# Patient Record
Sex: FEMALE | Race: OTHER | HISPANIC OR LATINO | URBAN - METROPOLITAN AREA
[De-identification: names, ages, dates, MRNs, and addresses within clinical notes are randomized per-mention and may not be internally consistent; named-entity substitution may affect disease eponyms.]

---

## 2020-06-25 ENCOUNTER — EMERGENCY (EMERGENCY)
Facility: HOSPITAL | Age: 45
LOS: 1 days | Discharge: HOME | End: 2020-06-25
Attending: EMERGENCY MEDICINE | Admitting: EMERGENCY MEDICINE
Payer: MEDICAID

## 2020-06-25 VITALS
SYSTOLIC BLOOD PRESSURE: 138 MMHG | RESPIRATION RATE: 16 BRPM | DIASTOLIC BLOOD PRESSURE: 80 MMHG | OXYGEN SATURATION: 99 % | TEMPERATURE: 99 F | HEART RATE: 80 BPM

## 2020-06-25 DIAGNOSIS — Z98.82 BREAST IMPLANT STATUS: Chronic | ICD-10-CM

## 2020-06-25 DIAGNOSIS — Z98.89 OTHER SPECIFIED POSTPROCEDURAL STATES: Chronic | ICD-10-CM

## 2020-06-25 PROCEDURE — 99283 EMERGENCY DEPT VISIT LOW MDM: CPT

## 2020-06-25 RX ORDER — IBUPROFEN 200 MG
600 TABLET ORAL ONCE
Refills: 0 | Status: COMPLETED | OUTPATIENT
Start: 2020-06-25 | End: 2020-06-25

## 2020-06-25 RX ORDER — IBUPROFEN 200 MG
1 TABLET ORAL
Qty: 20 | Refills: 0
Start: 2020-06-25

## 2020-06-25 RX ORDER — CYCLOBENZAPRINE HYDROCHLORIDE 10 MG/1
1 TABLET, FILM COATED ORAL
Qty: 12 | Refills: 0
Start: 2020-06-25

## 2020-06-25 RX ORDER — CYCLOBENZAPRINE HYDROCHLORIDE 10 MG/1
5 TABLET, FILM COATED ORAL ONCE
Refills: 0 | Status: COMPLETED | OUTPATIENT
Start: 2020-06-25 | End: 2020-06-25

## 2020-06-25 RX ORDER — LIDOCAINE 4 G/100G
1 CREAM TOPICAL ONCE
Refills: 0 | Status: COMPLETED | OUTPATIENT
Start: 2020-06-25 | End: 2020-06-25

## 2020-06-25 RX ADMIN — CYCLOBENZAPRINE HYDROCHLORIDE 5 MILLIGRAM(S): 10 TABLET, FILM COATED ORAL at 13:37

## 2020-06-25 RX ADMIN — Medication 600 MILLIGRAM(S): at 13:38

## 2020-06-25 RX ADMIN — LIDOCAINE 1 PATCH: 4 CREAM TOPICAL at 13:38
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
## 2020-06-25 NOTE — ED PROVIDER NOTE - NSFOLLOWUPINSTRUCTIONS_ED_ALL_ED_FT
Rest and avoid heavy lifting.   May take Motrin 600mg every 6 hours as needed for moderate pain. Take with food.   May take Flexeril 5mg every 6 hours as needed for severe pain.
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
## 2020-06-25 NOTE — ED PROVIDER NOTE - PATIENT PORTAL LINK FT
You can access the FollowMyHealth Patient Portal offered by [facility] by registering at the following website: [email or web address]. By joining [employer]’s FollowMyHealth portal, you will also be able to view your health information using other applications (apps) compatible with our system.

## 2020-11-27 ENCOUNTER — EMERGENCY (EMERGENCY)
Facility: HOSPITAL | Age: 45
LOS: 1 days | Discharge: HOME | End: 2020-11-27
Attending: EMERGENCY MEDICINE | Admitting: EMERGENCY MEDICINE
Payer: MEDICAID

## 2020-11-27 VITALS
HEIGHT: 62 IN | OXYGEN SATURATION: 99 % | SYSTOLIC BLOOD PRESSURE: 132 MMHG | HEART RATE: 85 BPM | RESPIRATION RATE: 18 BRPM | TEMPERATURE: 98 F | DIASTOLIC BLOOD PRESSURE: 83 MMHG

## 2020-11-27 DIAGNOSIS — Z98.89 OTHER SPECIFIED POSTPROCEDURAL STATES: Chronic | ICD-10-CM

## 2020-11-27 DIAGNOSIS — Z90.710 ACQUIRED ABSENCE OF BOTH CERVIX AND UTERUS: Chronic | ICD-10-CM

## 2020-11-27 DIAGNOSIS — Z98.82 BREAST IMPLANT STATUS: Chronic | ICD-10-CM

## 2020-11-27 LAB
APPEARANCE UR: SIGNIFICANT CHANGE UP
BACTERIA # UR AUTO: NEGATIVE — SIGNIFICANT CHANGE UP
BILIRUB UR-MCNC: NEGATIVE — SIGNIFICANT CHANGE UP
BLOOD UR QL VISUAL: HIGH
COLOR SPEC: SIGNIFICANT CHANGE UP
GLUCOSE UR-MCNC: NEGATIVE — SIGNIFICANT CHANGE UP
HYALINE CASTS # UR AUTO: SIGNIFICANT CHANGE UP
KETONES UR-MCNC: NEGATIVE — SIGNIFICANT CHANGE UP
LEUKOCYTE ESTERASE UR-ACNC: SIGNIFICANT CHANGE UP
NITRITE UR-MCNC: NEGATIVE — SIGNIFICANT CHANGE UP
PH UR: 7 — SIGNIFICANT CHANGE UP (ref 5–8)
PROT UR-MCNC: 20 — SIGNIFICANT CHANGE UP
RBC CASTS # UR COMP ASSIST: >50 — HIGH (ref 0–?)
SP GR SPEC: 1.01 — SIGNIFICANT CHANGE UP (ref 1–1.04)
SQUAMOUS # UR AUTO: SIGNIFICANT CHANGE UP
UROBILINOGEN FLD QL: NORMAL — SIGNIFICANT CHANGE UP
WBC UR QL: >50 — HIGH (ref 0–?)

## 2020-11-27 PROCEDURE — 99283 EMERGENCY DEPT VISIT LOW MDM: CPT

## 2020-11-27 RX ORDER — CEPHALEXIN 500 MG
1 CAPSULE ORAL
Qty: 28 | Refills: 0
Start: 2020-11-27 | End: 2020-12-03

## 2020-11-27 NOTE — ED PROVIDER NOTE - NSFOLLOWUPINSTRUCTIONS_ED_ALL_ED_FT
You came to the ER with burning urination and blood in your urine. We checked your urine and believe it is from a urinary tract infection. We have sent a medication CEPHALEXIN/KEFLEX 500 MG to your pharmacy, take one pill every six hours for a week. Follow up with your primary care doctor after.    Please return if you develop a fever, have worse back pain, stop producing urine, feel more ill, or are at all concerned and would like re evaluation. It is possible though unlikely that this infection could spread upwards to your kidneys and need further treatment.     -If you do not have a PMD, please call [phone] to find one convenient for you or call our clinic at [phone].

## 2020-11-27 NOTE — ED PROVIDER NOTE - PROGRESS NOTE DETAILS
Jose: spoke with patient she feels better, has minimal pain at this time. informed her of UTI, given return precautions, sent rx to pharmacy. Pt verbalizes understanding instructions.

## 2020-11-27 NOTE — ED PROVIDER NOTE - NS ED ROS FT
Constitutional: (-) fever (-) vomiting  Eyes/ENT: (-) vision changes  Cardiovascular: (-) chest pain, (-) wheezing  Respiratory: (-) cough, (-) shortness of breath  Gastrointestinal: (-) vomiting, (-) diarrhea  : (+) dysuria   Musculoskeletal: (-) back pain  Integumentary: (-) rash, (-) edema  Neurological: (-)loc  Allergic/Immunologic: (-) pruritus  Endocrine: No history of thyroid disease

## 2020-11-27 NOTE — ED PROVIDER NOTE - PHYSICAL EXAMINATION
Vitals: I have reviewed the patients vital signs. Afebrile non tachy  General: well appearing, well nourished, no acute distress  HEENT: atraumatic, normocephalic, airway patent, EOMI and appropriate tracking  Neck: no JVD, no tracheal deviation  Chest/Lungs: no trauma, symmetric chest rise, lung sounds clear bilaterally, speaking in complete sentences  Heart: Regular rate, regular rhythm, skin well perfused, 2+ pulses  Abdomen: Soft and nontender except for suprapubic area which has some mild tenderness and discomfort. No rebound or guarding  back: no cvat no midline tenderness  Neuro: A+Ox3, ambulating without difficulty, non dysarthric speech, moving all four extremities without difficulty  Eyes: EOMI, no conjunctival injection  MSK: all limbs at baseline strength, no wasting or atrophy,   Skin: no bleeding, no cyanosis, no jaundice, no new emergent lesions     Pelvic: Vitals: I have reviewed the patients vital signs. Afebrile non tachy  General: well appearing, well nourished, no acute distress  HEENT: atraumatic, normocephalic, airway patent, EOMI and appropriate tracking  Neck: no JVD, no tracheal deviation  Chest/Lungs: no trauma, symmetric chest rise, lung sounds clear bilaterally, speaking in complete sentences  Heart: Regular rate, regular rhythm, skin well perfused, 2+ pulses  Abdomen: Soft and nontender except for suprapubic area which has some mild tenderness and discomfort. No rebound or guarding  back: no cvat no midline tenderness  Neuro: A+Ox3, ambulating without difficulty, non dysarthric speech, moving all four extremities without difficulty  Eyes: EOMI, no conjunctival injection  MSK: all limbs at baseline strength, no wasting or atrophy,   Skin: no bleeding, no cyanosis, no jaundice, no new emergent lesions     Pelvic: chaperoned by PCA, no external or internal lesions, scant physiologic discharge, no bleeding, no large masses, no CMT, no adnexal tenderness.

## 2020-11-27 NOTE — ED PROVIDER NOTE - ATTENDING CONTRIBUTION TO CARE
I have personally performed a face to face bedside history and physical examination of this patient. I have discussed the history, examination, review of systems, assessment and plan of management with the resident. I have reviewed the electronic medical record and amended it to reflect my history, review of systems, physical exam, assessment and plan.     Brief HPI:  46 y/o F no sig PMH s/p DARRYL without oophorectomy presents with 1 day of worsening hematuria, dysuria (burning), suprapubic pain, and hematuria.  Reports chills, no measured temperatures at home.  Also with intermittent b/l flank pain.      Vitals:   Reviewed    Exam:    GEN:  Non-toxic appearing, non-distressed, speaking full sentences, non-diaphoretic, AAOx3  HEENT:  NCAT, neck supple, EOMI, PERRLA, sclera anicteric, no conjunctival pallor or injection, no stridor, normal voice, no tonsillar exudate, uvula midline, tympanic membranes and external auditory canals normal appearing bilaterally   CV:  regular rhythm and rate, s1/s2 audible, no murmurs, rubs or gallops, peripheral pulses 2+ and symmetric  PULM:  non-labored respirations, lungs clear to auscultation bilaterally, no wheezes, crackles or rales  ABD:  non distended, suprapubic tenderness, no rebound, no guarding, negative Dey's sign, bowel sounds normal, no cvat  MSK:  no gross deformity, non-tender extremities and joints, range of motion grossly normal appearing, no extremity edema, extremities warm and well perfused   NEURO:  AAOx3, CN II-XII intact, motor 5/5 in upper and lower extremities bilaterally, sensation grossly intact in extremities and trunk, finger to nose testing wnl, no nystagmus, negative Romberg, no pronator drift, no gait deficit  SKIN:  warm, dry, no rash or vesicles     A/P:  46 y/o F no sig PMH s/p DARRYL without oophorectomy presents with 1 day of worsening hematuria, dysuria (burning), suprapubic pain, and hematuria.  VSS AF.  Exam with mild suprapubic tenderness, no cvat.  Suspect cystitis.  Will send ua.  Dispo pending.

## 2020-11-27 NOTE — ED PROVIDER NOTE - PATIENT PORTAL LINK FT
You can access the FollowMyHealth Patient Portal offered by [facility] by registering at the following website: [email or web address]. By joining [employer]’s FollowMyHealth portal, you will also be able to view your health information using other applications (apps) compatible with our system.

## 2020-11-27 NOTE — ED PROVIDER NOTE - CLINICAL SUMMARY MEDICAL DECISION MAKING FREE TEXT BOX
46 y/o F no sig PMH s/p DARRYL without oophorectomy presents with 1 day of worsening hematuria, dysuria (burning), suprapubic pain, and hematuria- now passing clots and beena blood. Afebrile, feels warm, recent travel without sick contacts. Suprapubic pain, no CVAT. Will do pelvic exam. Considering hemorrhagic cystitis, less likely STI/mass/PID though has dyspareunia so will eval for STI. Unlikely pyelo or stone, no CVAT, pain is minimal. Likely DC.

## 2020-11-27 NOTE — ED PROVIDER NOTE - OBJECTIVE STATEMENT
44 y/o F no sig PMH s/p DARRYL without oophorectomy presents with 1 day of worsening hematuria, dysuria (burning), suprapubic pain, and hematuria- now passing clots and beena blood. States has never had anything like this before, no previous UTIs. Doesn't have fever documented though feels "warm" inside. Recent travel to [place], no specific known sick contacts. Sexually active, one partner, no barrier protection. No vaginal discharge though does have dyspareunia. Denies upper abd pain, cp, sob.

## 2020-11-28 LAB
C TRACH RRNA SPEC QL NAA+PROBE: SIGNIFICANT CHANGE UP
N GONORRHOEA RRNA SPEC QL NAA+PROBE: SIGNIFICANT CHANGE UP
SPECIMEN SOURCE: SIGNIFICANT CHANGE UP

## 2022-07-13 ENCOUNTER — EMERGENCY (EMERGENCY)
Facility: HOSPITAL | Age: 47
LOS: 1 days | Discharge: HOME | End: 2022-07-13
Attending: STUDENT IN AN ORGANIZED HEALTH CARE EDUCATION/TRAINING PROGRAM | Admitting: STUDENT IN AN ORGANIZED HEALTH CARE EDUCATION/TRAINING PROGRAM

## 2022-07-13 VITALS
OXYGEN SATURATION: 100 % | DIASTOLIC BLOOD PRESSURE: 77 MMHG | TEMPERATURE: 98 F | RESPIRATION RATE: 16 BRPM | HEART RATE: 72 BPM | SYSTOLIC BLOOD PRESSURE: 135 MMHG | HEIGHT: 62 IN

## 2022-07-13 VITALS
HEART RATE: 74 BPM | RESPIRATION RATE: 16 BRPM | DIASTOLIC BLOOD PRESSURE: 76 MMHG | OXYGEN SATURATION: 100 % | SYSTOLIC BLOOD PRESSURE: 130 MMHG | TEMPERATURE: 99 F

## 2022-07-13 DIAGNOSIS — Z98.89 OTHER SPECIFIED POSTPROCEDURAL STATES: Chronic | ICD-10-CM

## 2022-07-13 DIAGNOSIS — Z98.82 BREAST IMPLANT STATUS: Chronic | ICD-10-CM

## 2022-07-13 DIAGNOSIS — Z90.710 ACQUIRED ABSENCE OF BOTH CERVIX AND UTERUS: Chronic | ICD-10-CM

## 2022-07-13 LAB
ALBUMIN SERPL ELPH-MCNC: 4.1 G/DL — SIGNIFICANT CHANGE UP (ref 3.3–5)
ALP SERPL-CCNC: 51 U/L — SIGNIFICANT CHANGE UP (ref 40–120)
ALT FLD-CCNC: 35 U/L — HIGH (ref 4–33)
ANION GAP SERPL CALC-SCNC: 10 MMOL/L — SIGNIFICANT CHANGE UP (ref 7–14)
AST SERPL-CCNC: 18 U/L — SIGNIFICANT CHANGE UP (ref 4–32)
BASOPHILS # BLD AUTO: 0.05 K/UL — SIGNIFICANT CHANGE UP (ref 0–0.2)
BASOPHILS NFR BLD AUTO: 0.7 % — SIGNIFICANT CHANGE UP (ref 0–2)
BILIRUB SERPL-MCNC: <0.2 MG/DL — SIGNIFICANT CHANGE UP (ref 0.2–1.2)
BUN SERPL-MCNC: 13 MG/DL — SIGNIFICANT CHANGE UP (ref 7–23)
CALCIUM SERPL-MCNC: 10 MG/DL — SIGNIFICANT CHANGE UP (ref 8.4–10.5)
CHLORIDE SERPL-SCNC: 101 MMOL/L — SIGNIFICANT CHANGE UP (ref 98–107)
CO2 SERPL-SCNC: 27 MMOL/L — SIGNIFICANT CHANGE UP (ref 22–31)
CREAT SERPL-MCNC: 0.68 MG/DL — SIGNIFICANT CHANGE UP (ref 0.5–1.3)
EGFR: 108 ML/MIN/1.73M2 — SIGNIFICANT CHANGE UP
EOSINOPHIL # BLD AUTO: 0.14 K/UL — SIGNIFICANT CHANGE UP (ref 0–0.5)
EOSINOPHIL NFR BLD AUTO: 1.9 % — SIGNIFICANT CHANGE UP (ref 0–6)
FLUAV AG NPH QL: SIGNIFICANT CHANGE UP
FLUBV AG NPH QL: SIGNIFICANT CHANGE UP
GLUCOSE SERPL-MCNC: 115 MG/DL — HIGH (ref 70–99)
HCT VFR BLD CALC: 39.4 % — SIGNIFICANT CHANGE UP (ref 34.5–45)
HGB BLD-MCNC: 13.1 G/DL — SIGNIFICANT CHANGE UP (ref 11.5–15.5)
IANC: 4.44 K/UL — SIGNIFICANT CHANGE UP (ref 1.8–7.4)
IMM GRANULOCYTES NFR BLD AUTO: 0.4 % — SIGNIFICANT CHANGE UP (ref 0–1.5)
LYMPHOCYTES # BLD AUTO: 2.15 K/UL — SIGNIFICANT CHANGE UP (ref 1–3.3)
LYMPHOCYTES # BLD AUTO: 28.9 % — SIGNIFICANT CHANGE UP (ref 13–44)
MCHC RBC-ENTMCNC: 30.7 PG — SIGNIFICANT CHANGE UP (ref 27–34)
MCHC RBC-ENTMCNC: 33.2 GM/DL — SIGNIFICANT CHANGE UP (ref 32–36)
MCV RBC AUTO: 92.3 FL — SIGNIFICANT CHANGE UP (ref 80–100)
MONOCYTES # BLD AUTO: 0.63 K/UL — SIGNIFICANT CHANGE UP (ref 0–0.9)
MONOCYTES NFR BLD AUTO: 8.5 % — SIGNIFICANT CHANGE UP (ref 2–14)
NEUTROPHILS # BLD AUTO: 4.44 K/UL — SIGNIFICANT CHANGE UP (ref 1.8–7.4)
NEUTROPHILS NFR BLD AUTO: 59.6 % — SIGNIFICANT CHANGE UP (ref 43–77)
NRBC # BLD: 0 /100 WBCS — SIGNIFICANT CHANGE UP
NRBC # FLD: 0 K/UL — SIGNIFICANT CHANGE UP
NT-PROBNP SERPL-SCNC: 55 PG/ML — SIGNIFICANT CHANGE UP
PLATELET # BLD AUTO: 211 K/UL — SIGNIFICANT CHANGE UP (ref 150–400)
POTASSIUM SERPL-MCNC: 4.3 MMOL/L — SIGNIFICANT CHANGE UP (ref 3.5–5.3)
POTASSIUM SERPL-SCNC: 4.3 MMOL/L — SIGNIFICANT CHANGE UP (ref 3.5–5.3)
PROT SERPL-MCNC: 6.7 G/DL — SIGNIFICANT CHANGE UP (ref 6–8.3)
RBC # BLD: 4.27 M/UL — SIGNIFICANT CHANGE UP (ref 3.8–5.2)
RBC # FLD: 12.3 % — SIGNIFICANT CHANGE UP (ref 10.3–14.5)
RSV RNA NPH QL NAA+NON-PROBE: SIGNIFICANT CHANGE UP
SARS-COV-2 RNA SPEC QL NAA+PROBE: SIGNIFICANT CHANGE UP
SODIUM SERPL-SCNC: 138 MMOL/L — SIGNIFICANT CHANGE UP (ref 135–145)
TROPONIN T, HIGH SENSITIVITY RESULT: <6 NG/L — SIGNIFICANT CHANGE UP
TROPONIN T, HIGH SENSITIVITY RESULT: <6 NG/L — SIGNIFICANT CHANGE UP
WBC # BLD: 7.44 K/UL — SIGNIFICANT CHANGE UP (ref 3.8–10.5)
WBC # FLD AUTO: 7.44 K/UL — SIGNIFICANT CHANGE UP (ref 3.8–10.5)

## 2022-07-13 PROCEDURE — 93010 ELECTROCARDIOGRAM REPORT: CPT

## 2022-07-13 PROCEDURE — 71046 X-RAY EXAM CHEST 2 VIEWS: CPT | Mod: 26

## 2022-07-13 PROCEDURE — 99285 EMERGENCY DEPT VISIT HI MDM: CPT | Mod: 25

## 2022-07-13 RX ORDER — ASPIRIN/CALCIUM CARB/MAGNESIUM 324 MG
162 TABLET ORAL ONCE
Refills: 0 | Status: COMPLETED | OUTPATIENT
Start: 2022-07-13 | End: 2022-07-13

## 2022-07-13 RX ORDER — ACETAMINOPHEN 500 MG
1000 TABLET ORAL ONCE
Refills: 0 | Status: COMPLETED | OUTPATIENT
Start: 2022-07-13 | End: 2022-07-13

## 2022-07-13 RX ADMIN — Medication 162 MILLIGRAM(S): at 11:16

## 2022-07-13 RX ADMIN — Medication 400 MILLIGRAM(S): at 11:16

## 2022-07-13 NOTE — ED PROVIDER NOTE - PHYSICAL EXAMINATION
CONSTITUTIONAL: Non-toxic, non-diaphoretic, in no apparent distress  HEAD: Normocephalic; atruamatic  EYES: EOM intact   ENMT: External appears normal; normal oropharynx, moist  NECK: grossly normal active ROM,  CARD: No cyanosis, good peripheral perfusion, RRR, no MRG, no pulse deficit  RESP: Normal chest excursion with respiration; no increased work of breathing, CTAB  ABD: SNTND  EXT: moving all extremities, no gross disfigurement or asymmetry,  SKIN: Warm, dry, no rash  NEURO:  moving all extremities, no facial droop, no dysarthria      cn2-12 intact

## 2022-07-13 NOTE — ED PROVIDER NOTE - CLINICAL SUMMARY MEDICAL DECISION MAKING FREE TEXT BOX
perc negative, pain not typical for dissection and no pulse deficit, will offer cdu for stress/echo to rule out acs.

## 2022-07-13 NOTE — ED PROVIDER NOTE - NSFOLLOWUPCLINICS_GEN_ALL_ED_FT
[facility]  Cardiology  [address] [place] [address]  Phone: [phone]  Fax:   Follow Up Time: 4-6 Days

## 2022-07-13 NOTE — ED ADULT NURSE REASSESSMENT NOTE - NS ED NURSE REASSESS COMMENT FT1
A&Ox4. ambulatory. NAD. pt denies SOB, chest pain, dizziness, weakness, urinary symptoms, HA, n/v/d, fevers, chills. respirations are even and un labored. safety precautions maintained.

## 2022-07-13 NOTE — ED PROVIDER NOTE - PROGRESS NOTE DETAILS
Mukund Med Tox Fellow: pt feels better after meds, ekg non ischemic, trops neg x2, heart score <4, pt ok to f/u as outpt with cards Return precautions reviewed. Patient verbalized understand of conditions for return and plan for follow up. Patient was instructed to utilize [phone] to obtain follow up as indicated.

## 2022-07-13 NOTE — ED PROVIDER NOTE - OBJECTIVE STATEMENT
46 year-old-female no significant past medical history  presents to the emergency department with chest pain  central chest, 10/10 on exertion  pressure like  radiates to right arm  has no history of cardiac workup  on ros, the patient says she has pain when walking on right plantar fascia  no history of trauma

## 2022-07-13 NOTE — ED PROVIDER NOTE - NS ED ROS FT
CONSTITUTIONAL: No fever,  EYES: No redness  ENT: no sore throat  CARDIOVASCULAR: +++ chest pain,  RESPIRATORY: No cough, ++++ shortness of breath  GI: No abdominal pain, no nausea, no vomiting,  MUSKULOSKELETAL:+++ new pain in joints/muscles  SKIN: No rash  NEURO: No headache  ALL OTHER SYSTEMS NEGATIVE.

## 2022-07-13 NOTE — ED ADULT TRIAGE NOTE - CHIEF COMPLAINT QUOTE
pt with no pmh, c/o intermittent right sided chest and right arm pain, worse with movement. pt took 600mg of motrin PTA. respirations even and unlabored.

## 2022-07-13 NOTE — ED PROVIDER NOTE - NSFOLLOWUPINSTRUCTIONS_ED_ALL_ED_FT
- Please follow up with your Primary Care Doctor within 1 week. Bring your results from today.    - Please follow up with Cardiologist within 1 week.     - Tylenol up to 650 mg every 6 hours as needed for pain and/or Ibuprofen up to 400 mg every 6 hours as needed for pain.    - Be sure to return to the ED if you develop new, worsening, or any distressing symptoms.

## 2022-07-13 NOTE — ED PROVIDER NOTE - PATIENT PORTAL LINK FT
You can access the FollowMyHealth Patient Portal offered by [facility] by registering at the following website: [email or web address]. By joining [employer]’s FollowMyHealth portal, you will also be able to view your health information using other applications (apps) compatible with our system.

## 2022-08-02 ENCOUNTER — APPOINTMENT (OUTPATIENT)
Dept: CARDIOLOGY | Facility: HOSPITAL | Age: 47
End: 2022-08-02

## 2022-08-02 DIAGNOSIS — Z83.3 FAMILY HISTORY OF DIABETES MELLITUS: ICD-10-CM

## 2022-08-02 DIAGNOSIS — Z78.9 OTHER SPECIFIED HEALTH STATUS: ICD-10-CM

## 2022-08-02 DIAGNOSIS — R07.9 CHEST PAIN, UNSPECIFIED: ICD-10-CM

## 2022-08-02 NOTE — HISTORY OF PRESENT ILLNESS
[FreeTextEntry1] : Mrs. Ramos is a 46yoF with no significant PMH presenting to clinic today as follow up after being evaluated in the [facility] ED. She presented to the ED with 10/10 substernal CP. Cardiac enzymes were negative, EKG was not considered concerning for ischemic. Heart Score was <4. The patient was discharged from the ED without admission. \par \par Since admission patient denies reoccurrence of the CP. She denies exertional symptoms, Palpitations, Dyspnea, Orthopnea, PND. \par

## 2022-08-02 NOTE — REVIEW OF SYSTEMS
[Fever] : no fever [Chills] : no chills [Blurry Vision] : no blurred vision [Earache] : no earache [Sore Throat] : no sore throat [SOB] : no shortness of breath [Dyspnea on exertion] : not dyspnea during exertion [Chest Discomfort] : no chest discomfort [Lower Ext Edema] : no extremity edema [Palpitations] : no palpitations [Orthopnea] : no orthopnea [PND] : no PND [Syncope] : no syncope [Cough] : no cough [Abdominal Pain] : no abdominal pain [Nausea] : no nausea [Vomiting] : no vomiting [Dysuria] : no dysuria [Myalgia] : no myalgia [Rash] : no rash [Dizziness] : no dizziness [Weakness] : no weakness [Confusion] : no confusion was observed [Easy Bleeding] : no tendency for easy bleeding

## 2022-08-02 NOTE — PHYSICAL EXAM
[Well Developed] : well developed [Normal Conjunctiva] : normal conjunctiva [Normal Venous Pressure] : normal venous pressure [Normal S1, S2] : normal S1, S2 [No Murmur] : no murmur [Clear Lung Fields] : clear lung fields [Soft] : abdomen soft [Non Tender] : non-tender [Normal Gait] : normal gait [No Edema] : no edema [No Rash] : no rash [Moves all extremities] : moves all extremities [No Focal Deficits] : no focal deficits [Alert and Oriented] : alert and oriented

## 2022-08-02 NOTE — ASSESSMENT
[FreeTextEntry1] : Mrs. Ramos is a 46yoF presenting to the clinic to follow up an ED visit prompted by CP that was determined to be unlikely cardiac in nature. \par \par \par #Chest Pain\par Presented to ED last month. Troponins were negative x2. EKG was non-ischemic with NSR, Heart score <4. \par

## 2022-08-09 ENCOUNTER — NON-APPOINTMENT (OUTPATIENT)
Age: 47
End: 2022-08-09

## 2022-08-09 ENCOUNTER — APPOINTMENT (OUTPATIENT)
Dept: CARDIOLOGY | Facility: HOSPITAL | Age: 47
End: 2022-08-09

## 2022-08-09 VITALS
HEIGHT: 62 IN | HEART RATE: 85 BPM | RESPIRATION RATE: 16 BRPM | TEMPERATURE: 98.4 F | OXYGEN SATURATION: 96 % | SYSTOLIC BLOOD PRESSURE: 119 MMHG | DIASTOLIC BLOOD PRESSURE: 71 MMHG | WEIGHT: 154 LBS | BODY MASS INDEX: 28.34 KG/M2

## 2022-08-09 NOTE — PHYSICAL EXAM
[Well Developed] : well developed [Well Nourished] : well nourished [Normal Conjunctiva] : normal conjunctiva [Normal Venous Pressure] : normal venous pressure [Normal S1, S2] : normal S1, S2 [No Murmur] : no murmur [No Rub] : no rub [No Gallop] : no gallop [Clear Lung Fields] : clear lung fields [Good Air Entry] : good air entry [No Respiratory Distress] : no respiratory distress  [Soft] : abdomen soft [Non Tender] : non-tender [Normal Gait] : normal gait [No Edema] : no edema [No Rash] : no rash [Moves all extremities] : moves all extremities [No Focal Deficits] : no focal deficits [Alert and Oriented] : alert and oriented

## 2022-08-10 NOTE — ASSESSMENT
[FreeTextEntry1] : Mrs. Bauer is a 46yoF presenting with atypical likely non-cardiac chest and arm pain. \par \par EKG in clinic normal sinus rhythm.\par \par #Chest Pain\par Very atypical description of chest pain, see above. Likely related to neurologic/musculoskeletal injury.\par -Recommend re-evaluation with PCP to address arm/chest/shoulder symptoms further. \par

## 2022-08-10 NOTE — REVIEW OF SYSTEMS
[Fever] : no fever [Headache] : no headache [Chills] : no chills [Blurry Vision] : no blurred vision [Earache] : no earache [SOB] : no shortness of breath [Dyspnea on exertion] : not dyspnea during exertion [Chest Discomfort] : no chest discomfort [Lower Ext Edema] : no extremity edema [Palpitations] : no palpitations [Orthopnea] : no orthopnea [PND] : no PND [Syncope] : no syncope [Cough] : no cough [Wheezing] : no wheezing [Abdominal Pain] : no abdominal pain [Nausea] : no nausea [Vomiting] : no vomiting [Dysuria] : no dysuria [Joint Pain] : no joint pain [Myalgia] : no myalgia [Rash] : no rash [Skin Lesions] : no skin lesions [Dizziness] : no dizziness [Numbness (Hypoesthesia)] : no numbness [Tingling (Paresthesia)] : no tingling [Weakness] : no weakness [Limb Weakness (Paresis)] : no limb weakness (Paresis) [Confusion] : no confusion was observed [Easy Bleeding] : no tendency for easy bleeding

## 2022-08-10 NOTE — HISTORY OF PRESENT ILLNESS
[FreeTextEntry1] : Mrs. Bauer is a 46yoF presenting to clinic today to follow up an ED visit for CP. \par \par Mrs. Bauer presented to [facility] on 7/13/22 with 10/10 central pressure like CP. \par \par While in the ED, troponin x2 was negative, EKG was thought to be non-ischemic is appearance, Heart score was <4. Perc score negative, CXR WNL. VS WNL. She was discharged and told to follow up as an outpt. Pain resolved with Tylenol and ASA. \par \par Today in clinic the patient reports that she is currently just experiencing right sided upper back pain but otherwise feels well. \par \par She explains that she has experienced right sided, "tight" chest pain with radiation into the right arm, associated with right arm movement and palpation to the chest wall for the past year. She states that the pain is completely random and can last for weeks when it presents. During some episodes shes noted right arm weakness associated with numbness/pain. She also reports occasional SOB/Cough/pain with bending forward during times when she experiences the pain. \par \par Her symptoms are not related to exertion. She denies palpitations, syncope/near syncope, PND/Orthopnea. \par \par She was working with her PCP for these symptoms who sent her for a back xray and ordered her to work with Physical therapy. \par \par No previous cardiac history.

## 2024-09-16 ENCOUNTER — RESULT REVIEW (OUTPATIENT)
Age: 49
End: 2024-09-16

## 2024-09-17 ENCOUNTER — NON-APPOINTMENT (OUTPATIENT)
Age: 49
End: 2024-09-17

## 2024-09-17 ENCOUNTER — APPOINTMENT (OUTPATIENT)
Dept: CT IMAGING | Facility: CLINIC | Age: 49
End: 2024-09-17
Payer: MEDICAID

## 2024-09-17 PROCEDURE — 71260 CT THORAX DX C+: CPT

## 2024-09-17 PROCEDURE — 74177 CT ABD & PELVIS W/CONTRAST: CPT

## 2024-09-18 ENCOUNTER — APPOINTMENT (OUTPATIENT)
Dept: SURGICAL ONCOLOGY | Facility: CLINIC | Age: 49
End: 2024-09-18
Payer: MEDICAID

## 2024-09-18 ENCOUNTER — APPOINTMENT (OUTPATIENT)
Dept: GASTROENTEROLOGY | Facility: CLINIC | Age: 49
End: 2024-09-18
Payer: MEDICAID

## 2024-09-18 VITALS — OXYGEN SATURATION: 97 % | WEIGHT: 151 LBS | HEIGHT: 62 IN | HEART RATE: 72 BPM | BODY MASS INDEX: 27.79 KG/M2

## 2024-09-18 VITALS — SYSTOLIC BLOOD PRESSURE: 115 MMHG | DIASTOLIC BLOOD PRESSURE: 70 MMHG

## 2024-09-18 VITALS
OXYGEN SATURATION: 97 % | BODY MASS INDEX: 27.79 KG/M2 | HEIGHT: 62 IN | SYSTOLIC BLOOD PRESSURE: 115 MMHG | HEART RATE: 72 BPM | WEIGHT: 151 LBS | DIASTOLIC BLOOD PRESSURE: 70 MMHG

## 2024-09-18 DIAGNOSIS — C16.4: ICD-10-CM

## 2024-09-18 DIAGNOSIS — Z82.49 FAMILY HISTORY OF ISCHEMIC HEART DISEASE AND OTHER DISEASES OF THE CIRCULATORY SYSTEM: ICD-10-CM

## 2024-09-18 LAB
ALBUMIN SERPL ELPH-MCNC: 4.3 G/DL
ALP BLD-CCNC: 49 U/L
ALT SERPL-CCNC: 29 U/L
ANION GAP SERPL CALC-SCNC: 11 MMOL/L
AST SERPL-CCNC: 17 U/L
BASOPHILS # BLD AUTO: 0.05 K/UL
BASOPHILS NFR BLD AUTO: 0.8 %
BILIRUB SERPL-MCNC: 0.5 MG/DL
BUN SERPL-MCNC: 18 MG/DL
CALCIUM SERPL-MCNC: 9.2 MG/DL
CANCER AG19-9 SERPL-ACNC: 11 U/ML
CEA SERPL-MCNC: 1.2 NG/ML
CHLORIDE SERPL-SCNC: 96 MMOL/L
CO2 SERPL-SCNC: 26 MMOL/L
CREAT SERPL-MCNC: 0.71 MG/DL
EGFR: 104 ML/MIN/1.73M2
EOSINOPHIL # BLD AUTO: 0.12 K/UL
EOSINOPHIL NFR BLD AUTO: 1.9 %
GLUCOSE SERPL-MCNC: 90 MG/DL
HCT VFR BLD CALC: 40.2 %
HGB BLD-MCNC: 13.1 G/DL
IMM GRANULOCYTES NFR BLD AUTO: 0.3 %
LYMPHOCYTES # BLD AUTO: 2.05 K/UL
LYMPHOCYTES NFR BLD AUTO: 32.2 %
MAN DIFF?: NORMAL
MCHC RBC-ENTMCNC: 29.6 PG
MCHC RBC-ENTMCNC: 32.6 GM/DL
MCV RBC AUTO: 91 FL
MONOCYTES # BLD AUTO: 0.45 K/UL
MONOCYTES NFR BLD AUTO: 7.1 %
NEUTROPHILS # BLD AUTO: 3.68 K/UL
NEUTROPHILS NFR BLD AUTO: 57.7 %
PLATELET # BLD AUTO: 242 K/UL
POTASSIUM SERPL-SCNC: 4.2 MMOL/L
PROT SERPL-MCNC: 6.7 G/DL
RBC # BLD: 4.42 M/UL
RBC # FLD: 12.8 %
SODIUM SERPL-SCNC: 133 MMOL/L
WBC # FLD AUTO: 6.37 K/UL

## 2024-09-18 PROCEDURE — 99204 OFFICE O/P NEW MOD 45 MIN: CPT

## 2024-09-18 NOTE — ASSESSMENT
[FreeTextEntry1] : 49F with gastric cancer. - Schedule pretreatment EUS staging.  - F/u with med/onc and surgery.   Total time spent to complete patient's assessment, review medical chart including labs & personal review of prior imaging and available endoscopy records, counseling and discussion of plan of care was more than 30 minutes.

## 2024-09-18 NOTE — REASON FOR VISIT
[Initial Consultation] : an initial consultation for [Initial MDC] : an initial MDC visit for [GI] : GI [Gastric Cancer] : gastric cancer

## 2024-09-18 NOTE — PHYSICAL EXAM

## 2024-09-18 NOTE — HISTORY OF PRESENT ILLNESS
[FreeTextEntry1] : 49F with no sig pmhx presenting for evaluation of gastric cancer. Had EGD with Dr. Meadows with pre-pyloric mass, biopsy showed adenocarcinoma. Referred for EUS staging. Denies any other complaints, no abd pain, n/v/d/c, melena, hematochezia, fever/chills, jaundice, dark urine, or other issues.   PMHX- None PSHX- Hysterectomy, breast augmentation, abdominoplasty SOCIAL: denies smoking, ETOH use or illicit drugs. PCP- Dr. Gage Gant- [address] [place] [id number]  CT CAP 9/17/24-A Sub centimeter hypodensity in the right hepatic lobe is too small to characterize and indeterminate in the setting of known malignancy. Consider more definitive characterization with contrast-enhanced liver protocol MRI. No CT evidence of metastatic disease in the chest. No lymphadenopathy.

## 2024-09-19 ENCOUNTER — NON-APPOINTMENT (OUTPATIENT)
Age: 49
End: 2024-09-19

## 2024-09-22 NOTE — ASSESSMENT
[FreeTextEntry1] : 50y/o female referred by Dr. 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 Pt has been suffering from chest pain and severe epigastric pain.    CT CAP 9/17/24-A subcentimeter hypodensity in the right hepatic lobe is too small to characterize and indeterminate in the setting of known malignancy. Consider more definitive characterization with contrast-enhanced liver protocol MRI. No CT evidence of metastatic disease in the chest. No lymphadenopathy.  Plan:  - Case discussed at [facility] - Abd-Liver protocol- MRI ordered stat for new right hepatic lobe lesions seen on CT -PET scan to r/o METs with new diagnosis of gastric ca -EUS for staging and BX 9/26 -Emailed lab for MSI and HER to add on studies and slide request.  -DX LAP after all above results for final staging.   I have discussed the diagnosis, therapeutic plan and options with the patient at length. Patient expressed verbal understanding to proceed with proposed plan. All questions answered. I have discussed the risks, benefits, alternatives, complications including but not limited to bleeding, infection, damage to adjacent structures,, sepsis, need for further procedures, tumor recurrence to the patient in detail. Patient expressed verbal understanding. Written informed consent to be obtained in the preoperative period.

## 2024-09-22 NOTE — HISTORY OF PRESENT ILLNESS
[Heartburn] : heartburn [Chest pain] : chest pain [EGD] : EGD [Biopsy] : biopsy performed [Gastric Cancer] : Gastric Cancer [Incidental finding] : no incidental findings [Weight loss] : no weight loss [Rectal Bleeding] : no rectal bleeding [Abdominal Pain] : no abdominal pain [Darker Urine] : no dark urine [New/worsening Diabetes] : no new/worsening diabetes [Acholic stools] : no acholic stools [Jaundice] : no jaundice [Pruritus] : no pruritus [Constipation] : no constipation [ERCP] : no ERCP [Endostent] : no Endostent [Colonoscopy] : no colonoscopy [EMR] : no EMR [ESD] : no ESD [EUS] : no EUS [Percutaneous Transhepatic Biliary Drainage] : no percutaneous transhepatic biliary drainage  [TextBox_4] : 09/18/24 [TextBox_6] : Pre-pylorus adenocarcinoma [TextBox_16] : Epigastric pain [TextBox_39] : Pending slides/ Outside path [TextBox_41] : results demonstrated adenocarcinoma in the pre-pyloric area. [TextBox_43] : 09/04/2024 [Is this a 2nd opinion?] : This is not a second opinion [Fully active, able to carry on all pre-disease performance without restriction] : Status 0 - Fully active, able to carry on all pre-disease performance without restriction [TextBox_5] : 09/18/2024 [TextBox_74] : CT CAP 9/17/24-A subcentimeter hypodensity in the right hepatic lobe is too small to characterize and indeterminate in the setting of known malignancy. Consider more definitive characterization with contrast-enhanced liver protocol MRI. No CT evidence of metastatic disease in the chest. No lymphadenopathy.  Appts made for 9/18/24 Sx ONC Med ONC [facility]  [FreeTextEntry6] : Request path slides  Liver MRI EUS for staging and BX DX LAP  [de-identified] : 48y/o female referred by Dr. 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 Pt has been suffering from chest pain and severe epigastric pain.   CEA-1.2 CA 19-9- 11 CBC CMP- WNL  PMHX- None  PSHX- Hysterectomy, breast augmentation, abdominoplasty  SOCIAL: denies smoking, ETOH use or illicit drugs.   PCP- Dr. Gage Gant- [address] [place] [address]  CT CAP 9/17/24-A Sub centimeter hypodensity in the right hepatic lobe is too small to characterize and indeterminate in the setting of known malignancy. Consider more definitive characterization with contrast-enhanced liver protocol MRI. No CT evidence of metastatic disease in the chest. No lymphadenopathy.  9/18/2024 Pt feels well today. Has been having epigastric pain. Pt accompanied with family; CT studies discussed with patient.

## 2024-09-22 NOTE — ASSESSMENT
[FreeTextEntry1] : 48y/o female referred by Dr. 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 Pt has been suffering from chest pain and severe epigastric pain.    CT CAP 9/17/24-A subcentimeter hypodensity in the right hepatic lobe is too small to characterize and indeterminate in the setting of known malignancy. Consider more definitive characterization with contrast-enhanced liver protocol MRI. No CT evidence of metastatic disease in the chest. No lymphadenopathy.  Plan:  - Case discussed at [facility] [place] [facility] - Abd-Liver protocol- MRI ordered stat for new right hepatic lobe lesions seen on CT -PET scan to r/o METs with new diagnosis of gastric ca -EUS for staging and BX 9/26 -Emailed lab for MSI and HER to add on studies and slide request.  -DX LAP after all above results for final staging.   I have discussed the diagnosis, therapeutic plan and options with the patient at length. Patient expressed verbal understanding to proceed with proposed plan. All questions answered. I have discussed the risks, benefits, alternatives, complications including but not limited to bleeding, infection, damage to adjacent structures,, sepsis, need for further procedures, tumor recurrence to the patient in detail. Patient expressed verbal understanding. Written informed consent to be obtained in the preoperative period.

## 2024-09-22 NOTE — PHYSICAL EXAM
[Normal] : supple, no neck mass and thyroid not enlarged [Normal Neck Lymph Nodes] : normal neck lymph nodes  [Normal Supraclavicular Lymph Nodes] : normal supraclavicular lymph nodes [Normal Groin Lymph Nodes] : normal groin lymph nodes [Normal Axillary Lymph Nodes] : normal axillary lymph nodes [Normal] : oriented to person, place and time, with appropriate affect [FreeTextEntry1] :   COVID -19 precautions as per [facility] policy was universally followed. [de-identified] : Abdomen soft, nontender, nondistended, no palpable masses.

## 2024-09-22 NOTE — ASSESSMENT
[FreeTextEntry1] : 50y/o female referred by Dr. 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 Pt has been suffering from chest pain and severe epigastric pain.    CT CAP 9/17/24-A subcentimeter hypodensity in the right hepatic lobe is too small to characterize and indeterminate in the setting of known malignancy. Consider more definitive characterization with contrast-enhanced liver protocol MRI. No CT evidence of metastatic disease in the chest. No lymphadenopathy.  Plan:  - Case discussed at [facility] [place] [facility] - Abd-Liver protocol- MRI ordered stat for new right hepatic lobe lesions seen on CT -PET scan to r/o METs with new diagnosis of gastric ca -EUS for staging and BX 9/26 -Emailed lab for MSI and HER to add on studies and slide request.  -DX LAP after all above results for final staging.   I have discussed the diagnosis, therapeutic plan and options with the patient at length. Patient expressed verbal understanding to proceed with proposed plan. All questions answered. I have discussed the risks, benefits, alternatives, complications including but not limited to bleeding, infection, damage to adjacent structures,, sepsis, need for further procedures, tumor recurrence to the patient in detail. Patient expressed verbal understanding. Written informed consent to be obtained in the preoperative period.

## 2024-09-22 NOTE — HISTORY OF PRESENT ILLNESS
[Heartburn] : heartburn [Chest pain] : chest pain [EGD] : EGD [Biopsy] : biopsy performed [Gastric Cancer] : Gastric Cancer [Incidental finding] : no incidental findings [Weight loss] : no weight loss [Rectal Bleeding] : no rectal bleeding [Abdominal Pain] : no abdominal pain [Darker Urine] : no dark urine [New/worsening Diabetes] : no new/worsening diabetes [Acholic stools] : no acholic stools [Jaundice] : no jaundice [Pruritus] : no pruritus [Constipation] : no constipation [ERCP] : no ERCP [Endostent] : no Endostent [Colonoscopy] : no colonoscopy [EMR] : no EMR [ESD] : no ESD [EUS] : no EUS [Percutaneous Transhepatic Biliary Drainage] : no percutaneous transhepatic biliary drainage  [TextBox_4] : 09/18/24 [TextBox_6] : Pre-pylorus adenocarcinoma [TextBox_16] : Epigastric pain [TextBox_39] : Pending slides/ Outside path [TextBox_41] : results demonstrated adenocarcinoma in the pre-pyloric area. [TextBox_43] : 09/04/2024 [Is this a 2nd opinion?] : This is not a second opinion [Fully active, able to carry on all pre-disease performance without restriction] : Status 0 - Fully active, able to carry on all pre-disease performance without restriction [TextBox_5] : 09/18/2024 [TextBox_74] : CT CAP 9/17/24-A subcentimeter hypodensity in the right hepatic lobe is too small to characterize and indeterminate in the setting of known malignancy. Consider more definitive characterization with contrast-enhanced liver protocol MRI. No CT evidence of metastatic disease in the chest. No lymphadenopathy.  Appts made for 9/18/24 Sx ONC Med ONC Advanced GI  [FreeTextEntry6] : Request path slides  Liver MRI EUS for staging and BX DX LAP  [de-identified] : 50y/o female referred by Dr. 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 Pt has been suffering from chest pain and severe epigastric pain.   CEA-1.2 CA 19-9- 11 CBC CMP- WNL  PMHX- None  PSHX- Hysterectomy, breast augmentation, abdominoplasty  SOCIAL: denies smoking, ETOH use or illicit drugs.   PCP- Dr. Gage Gant- [address] [place] [address]  CT CAP 9/17/24-A Sub centimeter hypodensity in the right hepatic lobe is too small to characterize and indeterminate in the setting of known malignancy. Consider more definitive characterization with contrast-enhanced liver protocol MRI. No CT evidence of metastatic disease in the chest. No lymphadenopathy.  9/18/2024 Pt feels well today. Has been having epigastric pain. Pt accompanied with family; CT studies discussed with patient.

## 2024-09-22 NOTE — PHYSICAL EXAM
[Normal] : supple, no neck mass and thyroid not enlarged [Normal Neck Lymph Nodes] : normal neck lymph nodes  [Normal Supraclavicular Lymph Nodes] : normal supraclavicular lymph nodes [Normal Groin Lymph Nodes] : normal groin lymph nodes [Normal Axillary Lymph Nodes] : normal axillary lymph nodes [Normal] : oriented to person, place and time, with appropriate affect [FreeTextEntry1] :   COVID -19 precautions as per [facility] policy was universally followed. [de-identified] : Abdomen soft, nontender, nondistended, no palpable masses.

## 2024-09-22 NOTE — CONSULT LETTER
[Dear  ___] : Dear  [unfilled], [Please see my note below.] : Please see my note below. [Sincerely,] : Sincerely, [FreeTextEntry3] :   Geo Rajan MD, JANETTE, FACS Director of Surgical Oncology- [facility] , Department of Surgery [facility] at [place] [id number] Ph: [phone] Cell: [phone]

## 2024-09-22 NOTE — PHYSICAL EXAM
[Normal] : supple, no neck mass and thyroid not enlarged [Normal Neck Lymph Nodes] : normal neck lymph nodes  [Normal Supraclavicular Lymph Nodes] : normal supraclavicular lymph nodes [Normal Groin Lymph Nodes] : normal groin lymph nodes [Normal Axillary Lymph Nodes] : normal axillary lymph nodes [Normal] : oriented to person, place and time, with appropriate affect [FreeTextEntry1] :   COVID -19 precautions as per [facility] policy was universally followed. [de-identified] : Abdomen soft, nontender, nondistended, no palpable masses.

## 2024-09-22 NOTE — HISTORY OF PRESENT ILLNESS
[Heartburn] : heartburn [Chest pain] : chest pain [EGD] : EGD [Biopsy] : biopsy performed [Gastric Cancer] : Gastric Cancer [Incidental finding] : no incidental findings [Weight loss] : no weight loss [Rectal Bleeding] : no rectal bleeding [Abdominal Pain] : no abdominal pain [Darker Urine] : no dark urine [New/worsening Diabetes] : no new/worsening diabetes [Acholic stools] : no acholic stools [Jaundice] : no jaundice [Pruritus] : no pruritus [Constipation] : no constipation [ERCP] : no ERCP [Endostent] : no Endostent [Colonoscopy] : no colonoscopy [EMR] : no EMR [ESD] : no ESD [EUS] : no EUS [Percutaneous Transhepatic Biliary Drainage] : no percutaneous transhepatic biliary drainage  [TextBox_4] : 09/18/24 [TextBox_6] : Pre-pylorus adenocarcinoma [TextBox_16] : Epigastric pain [TextBox_39] : Pending slides/ Outside path [TextBox_41] : results demonstrated adenocarcinoma in the pre-pyloric area. [TextBox_43] : 09/04/2024 [Is this a 2nd opinion?] : This is not a second opinion [Fully active, able to carry on all pre-disease performance without restriction] : Status 0 - Fully active, able to carry on all pre-disease performance without restriction [TextBox_5] : 09/18/2024 [TextBox_74] : CT CAP 9/17/24-A subcentimeter hypodensity in the right hepatic lobe is too small to characterize and indeterminate in the setting of known malignancy. Consider more definitive characterization with contrast-enhanced liver protocol MRI. No CT evidence of metastatic disease in the chest. No lymphadenopathy.  Appts made for 9/18/24 Sx ONC Med ONC Advanced GI  [FreeTextEntry6] : Request path slides  Liver MRI EUS for staging and BX DX LAP  [de-identified] : 48y/o female referred by Dr. 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 Pt has been suffering from chest pain and severe epigastric pain.   CEA-1.2 CA 19-9- 11 CBC CMP- WNL  PMHX- None  PSHX- Hysterectomy, breast augmentation, abdominoplasty  SOCIAL: denies smoking, ETOH use or illicit drugs.   PCP- Dr. Gage Gant- [address] [place] [address]  CT CAP 9/17/24-A Sub centimeter hypodensity in the right hepatic lobe is too small to characterize and indeterminate in the setting of known malignancy. Consider more definitive characterization with contrast-enhanced liver protocol MRI. No CT evidence of metastatic disease in the chest. No lymphadenopathy.  9/18/2024 Pt feels well today. Has been having epigastric pain. Pt accompanied with family; CT studies discussed with patient.

## 2024-09-22 NOTE — PHYSICAL EXAM
[Normal] : supple, no neck mass and thyroid not enlarged [Normal Neck Lymph Nodes] : normal neck lymph nodes  [Normal Supraclavicular Lymph Nodes] : normal supraclavicular lymph nodes [Normal Groin Lymph Nodes] : normal groin lymph nodes [Normal Axillary Lymph Nodes] : normal axillary lymph nodes [Normal] : oriented to person, place and time, with appropriate affect [FreeTextEntry1] :   COVID -19 precautions as per [facility] policy was universally followed. [de-identified] : Abdomen soft, nontender, nondistended, no palpable masses.

## 2024-09-22 NOTE — ASSESSMENT
[FreeTextEntry1] : 50y/o female referred by Dr. 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 Pt has been suffering from chest pain and severe epigastric pain.    CT CAP 9/17/24-A subcentimeter hypodensity in the right hepatic lobe is too small to characterize and indeterminate in the setting of known malignancy. Consider more definitive characterization with contrast-enhanced liver protocol MRI. No CT evidence of metastatic disease in the chest. No lymphadenopathy.  Plan:  - Case discussed at [facility] [place] [facility] - Abd-Liver protocol- MRI ordered stat for new right hepatic lobe lesions seen on CT -PET scan to r/o METs with new diagnosis of gastric ca -EUS for staging and BX 9/26 -Emailed lab for MSI and HER to add on studies and slide request.  -DX LAP after all above results for final staging.   I have discussed the diagnosis, therapeutic plan and options with the patient at length. Patient expressed verbal understanding to proceed with proposed plan. All questions answered. I have discussed the risks, benefits, alternatives, complications including but not limited to bleeding, infection, damage to adjacent structures,, sepsis, need for further procedures, tumor recurrence to the patient in detail. Patient expressed verbal understanding. Written informed consent to be obtained in the preoperative period.

## 2024-09-22 NOTE — CONSULT LETTER
[Dear  ___] : Dear  [unfilled], [Please see my note below.] : Please see my note below. [Sincerely,] : Sincerely, [FreeTextEntry3] :   Geo Rajan MD, JANETTE, FACS Director of Surgical Oncology- [facility] , Department of Surgery [facility] [place] [id number] Ph: [phone] Cell: [phone]

## 2024-09-22 NOTE — CONSULT LETTER
[Dear  ___] : Dear  [unfilled], [Please see my note below.] : Please see my note below. [Sincerely,] : Sincerely, [FreeTextEntry3] :   Geo Rajan MD, JANETTE, FACS Director of Surgical Oncology- [facility] , Department of Surgery [facility] [place] [id number] Ph: [phone] Cell: [phone]

## 2024-09-22 NOTE — HISTORY OF PRESENT ILLNESS
[Heartburn] : heartburn [Chest pain] : chest pain [EGD] : EGD [Biopsy] : biopsy performed [Gastric Cancer] : Gastric Cancer [Incidental finding] : no incidental findings [Weight loss] : no weight loss [Rectal Bleeding] : no rectal bleeding [Abdominal Pain] : no abdominal pain [Darker Urine] : no dark urine [New/worsening Diabetes] : no new/worsening diabetes [Acholic stools] : no acholic stools [Jaundice] : no jaundice [Pruritus] : no pruritus [Constipation] : no constipation [ERCP] : no ERCP [Endostent] : no Endostent [Colonoscopy] : no colonoscopy [EMR] : no EMR [ESD] : no ESD [EUS] : no EUS [Percutaneous Transhepatic Biliary Drainage] : no percutaneous transhepatic biliary drainage  [TextBox_4] : 09/18/24 [TextBox_6] : Pre-pylorus adenocarcinoma [TextBox_16] : Epigastric pain [TextBox_39] : Pending slides/ Outside path [TextBox_41] : results demonstrated adenocarcinoma in the pre-pyloric area. [TextBox_43] : 09/04/2024 [Is this a 2nd opinion?] : This is not a second opinion [Fully active, able to carry on all pre-disease performance without restriction] : Status 0 - Fully active, able to carry on all pre-disease performance without restriction [TextBox_5] : 09/18/2024 [TextBox_74] : CT CAP 9/17/24-A subcentimeter hypodensity in the right hepatic lobe is too small to characterize and indeterminate in the setting of known malignancy. Consider more definitive characterization with contrast-enhanced liver protocol MRI. No CT evidence of metastatic disease in the chest. No lymphadenopathy.  Appts made for 9/18/24 Sx ONC Med ONC Advanced GI  [FreeTextEntry6] : Request path slides  Liver MRI EUS for staging and BX DX LAP  [de-identified] : 50y/o female referred by Dr. 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 Pt has been suffering from chest pain and severe epigastric pain.   CEA-1.2 CA 19-9- 11 CBC CMP- WNL  PMHX- None  PSHX- Hysterectomy, breast augmentation, abdominoplasty  SOCIAL: denies smoking, ETOH use or illicit drugs.   PCP- Dr. Gage Gant- [address] [place] [address]  CT CAP 9/17/24-A Sub centimeter hypodensity in the right hepatic lobe is too small to characterize and indeterminate in the setting of known malignancy. Consider more definitive characterization with contrast-enhanced liver protocol MRI. No CT evidence of metastatic disease in the chest. No lymphadenopathy.  9/18/2024 Pt feels well today. Has been having epigastric pain. Pt accompanied with family; CT studies discussed with patient.

## 2024-09-22 NOTE — CONSULT LETTER
[Dear  ___] : Dear  [unfilled], [Please see my note below.] : Please see my note below. [Sincerely,] : Sincerely, [FreeTextEntry3] :   Geo Rajan MD, JANETTE, FACS Director of Surgical Oncology- [facility] , Department of Surgery [facility] at [place] [id number] Ph: [phone] Cell: [phone]

## 2024-09-24 ENCOUNTER — APPOINTMENT (OUTPATIENT)
Dept: HEMATOLOGY ONCOLOGY | Facility: CLINIC | Age: 49
End: 2024-09-24

## 2024-09-26 ENCOUNTER — OUTPATIENT (OUTPATIENT)
Dept: OUTPATIENT SERVICES | Facility: HOSPITAL | Age: 49
LOS: 1 days | Discharge: HOME | End: 2024-09-26
Payer: MEDICAID

## 2024-09-26 ENCOUNTER — RESULT REVIEW (OUTPATIENT)
Age: 49
End: 2024-09-26

## 2024-09-26 ENCOUNTER — OUTPATIENT (OUTPATIENT)
Dept: OUTPATIENT SERVICES | Facility: HOSPITAL | Age: 49
LOS: 1 days | End: 2024-09-26

## 2024-09-26 ENCOUNTER — APPOINTMENT (OUTPATIENT)
Dept: GASTROENTEROLOGY | Facility: HOSPITAL | Age: 49
End: 2024-09-26

## 2024-09-26 ENCOUNTER — TRANSCRIPTION ENCOUNTER (OUTPATIENT)
Age: 49
End: 2024-09-26

## 2024-09-26 VITALS
HEART RATE: 65 BPM | SYSTOLIC BLOOD PRESSURE: 101 MMHG | OXYGEN SATURATION: 98 % | DIASTOLIC BLOOD PRESSURE: 60 MMHG | RESPIRATION RATE: 15 BRPM | TEMPERATURE: 97 F

## 2024-09-26 VITALS
RESPIRATION RATE: 16 BRPM | WEIGHT: 149.91 LBS | DIASTOLIC BLOOD PRESSURE: 71 MMHG | HEIGHT: 62 IN | TEMPERATURE: 98 F | HEART RATE: 66 BPM | SYSTOLIC BLOOD PRESSURE: 103 MMHG | OXYGEN SATURATION: 98 %

## 2024-09-26 VITALS
OXYGEN SATURATION: 97 % | SYSTOLIC BLOOD PRESSURE: 121 MMHG | RESPIRATION RATE: 12 BRPM | HEART RATE: 71 BPM | DIASTOLIC BLOOD PRESSURE: 69 MMHG

## 2024-09-26 DIAGNOSIS — Z98.82 BREAST IMPLANT STATUS: Chronic | ICD-10-CM

## 2024-09-26 DIAGNOSIS — C16.4 MALIGNANT NEOPLASM OF PYLORUS: ICD-10-CM

## 2024-09-26 DIAGNOSIS — Z98.89 OTHER SPECIFIED POSTPROCEDURAL STATES: Chronic | ICD-10-CM

## 2024-09-26 DIAGNOSIS — G47.33 OBSTRUCTIVE SLEEP APNEA (ADULT) (PEDIATRIC): ICD-10-CM

## 2024-09-26 DIAGNOSIS — Z90.710 ACQUIRED ABSENCE OF BOTH CERVIX AND UTERUS: Chronic | ICD-10-CM

## 2024-09-26 LAB
BASOPHILS # BLD AUTO: 0.04 K/UL — SIGNIFICANT CHANGE UP (ref 0–0.2)
BASOPHILS NFR BLD AUTO: 0.6 % — SIGNIFICANT CHANGE UP (ref 0–2)
EOSINOPHIL # BLD AUTO: 0.12 K/UL — SIGNIFICANT CHANGE UP (ref 0–0.5)
EOSINOPHIL NFR BLD AUTO: 1.7 % — SIGNIFICANT CHANGE UP (ref 0–6)
HCT VFR BLD CALC: 39.6 % — SIGNIFICANT CHANGE UP (ref 34.5–45)
HGB BLD-MCNC: 13.2 G/DL — SIGNIFICANT CHANGE UP (ref 11.5–15.5)
IMM GRANULOCYTES NFR BLD AUTO: 0.1 % — SIGNIFICANT CHANGE UP (ref 0–0.9)
LYMPHOCYTES # BLD AUTO: 2.25 K/UL — SIGNIFICANT CHANGE UP (ref 1–3.3)
LYMPHOCYTES # BLD AUTO: 31.3 % — SIGNIFICANT CHANGE UP (ref 13–44)
MCHC RBC-ENTMCNC: 29.2 PG — SIGNIFICANT CHANGE UP (ref 27–34)
MCHC RBC-ENTMCNC: 33.3 GM/DL — SIGNIFICANT CHANGE UP (ref 32–36)
MCV RBC AUTO: 87.6 FL — SIGNIFICANT CHANGE UP (ref 80–100)
MONOCYTES # BLD AUTO: 0.43 K/UL — SIGNIFICANT CHANGE UP (ref 0–0.9)
MONOCYTES NFR BLD AUTO: 6 % — SIGNIFICANT CHANGE UP (ref 2–14)
NEUTROPHILS # BLD AUTO: 4.34 K/UL — SIGNIFICANT CHANGE UP (ref 1.8–7.4)
NEUTROPHILS NFR BLD AUTO: 60.3 % — SIGNIFICANT CHANGE UP (ref 43–77)
PLATELET # BLD AUTO: 242 K/UL — SIGNIFICANT CHANGE UP (ref 150–400)
RBC # BLD: 4.52 M/UL — SIGNIFICANT CHANGE UP (ref 3.8–5.2)
RBC # FLD: 12.7 % — SIGNIFICANT CHANGE UP (ref 10.3–14.5)
WBC # BLD: 7.19 K/UL — SIGNIFICANT CHANGE UP (ref 3.8–10.5)
WBC # FLD AUTO: 7.19 K/UL — SIGNIFICANT CHANGE UP (ref 3.8–10.5)

## 2024-09-26 PROCEDURE — 43239 EGD BIOPSY SINGLE/MULTIPLE: CPT

## 2024-09-26 PROCEDURE — 88341 IMHCHEM/IMCYTCHM EA ADD ANTB: CPT | Mod: 26

## 2024-09-26 PROCEDURE — 88360 TUMOR IMMUNOHISTOCHEM/MANUAL: CPT | Mod: 26

## 2024-09-26 PROCEDURE — 88342 IMHCHEM/IMCYTCHM 1ST ANTB: CPT | Mod: 26,59

## 2024-09-26 PROCEDURE — 43236 UPPR GI SCOPE W/SUBMUC INJ: CPT | Mod: 59

## 2024-09-26 PROCEDURE — 88305 TISSUE EXAM BY PATHOLOGIST: CPT | Mod: 26

## 2024-09-26 PROCEDURE — 43237 ENDOSCOPIC US EXAM ESOPH: CPT

## 2024-09-26 NOTE — ASU DISCHARGE PLAN (ADULT/PEDIATRIC) - ACCOMPANIED BY
-- Message is from the [facility]--    Reason for Call: Please have the referral coordinator (Deena) give a call back to Breann at Dr. Whaley office in regards to an authorization for a referral for a surgical procedure for patient.    Caller Information       Type Contact Phone    02/11/2019 11:21 AM Phone (Incoming) breann (Provider) [phone]     [facility]          Alternative phone number: n/a    Turnaround time given to caller:   \"This message will be sent to [state Provider's name]. The clinical team will fulfill your request as soon as they review your message.\"    
Referral was generated and faxed by cornelius 2/12/2019  
SENAIT  FROM [facility]. #[phone]. PATIENT IS HAVING A PROCEDURE DONE ON TOMORROW. NEED AUTHORIIZATION/REFERRAL ASAP.  
Family

## 2024-09-26 NOTE — H&P PST ADULT - HISTORY OF PRESENT ILLNESS
50 y/o female PMH hysterectomy (2017 for fibroids) presents to presurgical testing with diagnosis of malignant neoplasm of pylorus. She was evaluated for epigastric pain, on 8/26/24 at [facility] ED. Work up revealing an ulcer and cancer of pylorus. Pt is scheduled for a diagnostic laparoscopy peritoneal washings

## 2024-09-26 NOTE — PRE PROCEDURE NOTE - PRE PROCEDURE EVALUATION
HPI:    Here for endoscopy.    PAST MEDICAL & SURGICAL HISTORY:  Malignant neoplasm of pylorus      JASMINA (obstructive sleep apnea)      S/P breast augmentation      H/O abdominal hysterectomy        See chart.    MEDICATIONS:    See chart.     ALLERGIES:  No Known Allergies    See chart.     SOCIAL HISTORY:     Denies toxic habits.     FAMILY HISTORY:  Family history of diabetes mellitus (Grandparent)    Family history of cancer (Grandparent)      Noncontributory.     REVIEW OF SYSTEMS:  CONSTITUTIONAL: No weakness, fevers or chills.  EYES/ENT: No visual changes;  No vertigo or throat pain.  NECK: No pain or stiffness.  RESPIRATORY: No cough, wheezing, hemoptysis; No shortness of breath.  CARDIOVASCULAR: No chest pain or palpitations.  GASTROINTESTINAL: As per HPI.   GENITOURINARY: No dysuria, frequency or hematuria.  NEUROLOGICAL: No numbness or weakness.  SKIN: No itching, rashes.      PHYSICAL EXAM:  VITAL SIGNS:  T(C): 36.1 (09-26-24 @ 14:59), Max: 36.7 (09-26-24 @ 10:00)  HR: 65 (09-26-24 @ 14:53) (65 - 66)  BP: 101/60 (09-26-24 @ 14:53) (101/60 - 103/71)  RR: 15 (09-26-24 @ 14:53) (15 - 16)  SpO2: 98% (09-26-24 @ 14:53) (98% - 98%)  I/Os:     See chart.     GENERAL: MORENITA, non toxic, comfortable in bed  HEENT: EOMI, no icterus, no tracheal deviation, moist mucus membranes   CARDIO: Regular rate and rhythm, no murmurs, rubs or gallops  LUNGS: No wheezing, rales or rhonchi  ABDOMEN: Soft, non tender, non distended, no rebound or guarding  VASCULAR: Warm and well perfused without peripheral edema and palpable pulses  PSYCH AAO x 3, normal mood and affect   SKIN: warm, dry, intact     LABS:                         13.2   7.19  )-----------( 242      ( 26 Sep 2024 10:02 )             39.6                 RADIOLOGY & ADDITIONAL TESTS: Reviewed.       Risks, benefits, and alternatives of the procedure discussed at length including but not limited to bleeding, perforation, anesthesia related complication, infection, etc. Patient expressed understanding and agreeable for procedure. Patient stable for planned procedure.

## 2024-09-26 NOTE — ASU PATIENT PROFILE, ADULT - FALL HARM RISK - UNIVERSAL INTERVENTIONS
Bed in lowest position, wheels locked, appropriate side rails in place/Call bell, personal items and telephone in reach/Instruct patient to call for assistance before getting out of bed or chair/Non-slip footwear when patient is out of bed/Bertram to call system/Physically safe environment - no spills, clutter or unnecessary equipment/Purposeful Proactive Rounding/Room/bathroom lighting operational, light cord in reach

## 2024-09-26 NOTE — ASU DISCHARGE PLAN (ADULT/PEDIATRIC) - NS MD DC FALL RISK RISK
For information on Fall & Injury Prevention, visit: [email or web address] OR  [email or web address] OR  https://www.cdc.gov/steadi/patient.html

## 2024-09-26 NOTE — H&P PST ADULT - REASON FOR ADMISSION
"I'm having a laparoscopy" Odomzo Counseling- I discussed with the patient the risks of Odomzo including but not limited to nausea, vomiting, diarrhea, constipation, weight loss, changes in the sense of taste, decreased appetite, muscle spasms, and hair loss.  The patient verbalized understanding of the proper use and possible adverse effects of Odomzo.  All of the patient's questions and concerns were addressed.

## 2024-09-26 NOTE — H&P PST ADULT - PROBLEM SELECTOR PLAN 1
Patient tentatively scheduled for diagnostic laparoscopy, peritoneal washings for 10/4/24. Pre-op instructions provided. Pt given verbal and written instructions with teach back on chlorhexidine shampoo and pepcid. Pt verbalized understanding with return demonstration.     CBC CMP 9/18/24 HIE    PST CBC T&S ABO done at PST

## 2024-09-27 ENCOUNTER — APPOINTMENT (OUTPATIENT)
Dept: MRI IMAGING | Facility: CLINIC | Age: 49
End: 2024-09-27

## 2024-09-27 ENCOUNTER — RESULT REVIEW (OUTPATIENT)
Age: 49
End: 2024-09-27

## 2024-09-27 ENCOUNTER — APPOINTMENT (OUTPATIENT)
Dept: RADIOLOGY | Facility: CLINIC | Age: 49
End: 2024-09-27
Payer: MEDICAID

## 2024-09-27 PROBLEM — G47.33 OBSTRUCTIVE SLEEP APNEA (ADULT) (PEDIATRIC): Chronic | Status: ACTIVE | Noted: 2024-09-26

## 2024-09-27 PROCEDURE — 74018 RADEX ABDOMEN 1 VIEW: CPT

## 2024-09-27 PROCEDURE — 71045 X-RAY EXAM CHEST 1 VIEW: CPT

## 2024-09-27 PROCEDURE — 74183 MRI ABD W/O CNTR FLWD CNTR: CPT

## 2024-09-27 PROCEDURE — A9585: CPT

## 2024-10-01 PROBLEM — C16.4: Chronic | Status: ACTIVE | Noted: 2024-09-26

## 2024-10-02 ENCOUNTER — APPOINTMENT (OUTPATIENT)
Dept: GASTROENTEROLOGY | Facility: CLINIC | Age: 49
End: 2024-10-02
Payer: MEDICAID

## 2024-10-02 DIAGNOSIS — C16.4: ICD-10-CM

## 2024-10-02 LAB — SURGICAL PATHOLOGY STUDY: SIGNIFICANT CHANGE UP

## 2024-10-02 PROCEDURE — 99213 OFFICE O/P EST LOW 20 MIN: CPT | Mod: 95

## 2024-10-02 NOTE — HISTORY OF PRESENT ILLNESS
[FreeTextEntry1] : 49F with pmhx of gastric cancer presenting for follow-up. Had recent EUS showing T3 cancer. Had upcoming diagnostic laparoscopy and med/onc follow-up. Denies any other complaints, no abd pain, n/v/d/c, melena, hematochezia, fever/chills, jaundice, dark urine, or other issues.

## 2024-10-02 NOTE — ASSESSMENT
[FreeTextEntry1] : 49F with pmhx of gastric cancer presenting for follow-up. Had recent EUS showing T3 cancer. Had upcoming diagnostic laparoscopy and med/onc follow-up. Denies any other complaints, no abd pain, n/v/d/c, melena, hematochezia, fever/chills, jaundice, dark urine, or other issues. - Follow-up with med/onc and surg/onc. All questions answered.  - RTC PRN.  Total time spent to complete patient's assessment, review medical chart including labs & personal review of prior imaging and available endoscopy records, counseling and discussion of plan of care was more than 30 minutes.

## 2024-10-02 NOTE — REASON FOR VISIT
[Home] : at home, [unfilled] , at the time of the visit. [Medical Office: ([facility])___] : at the medical office located in  [Other:____] : [unfilled] [Patient] : the patient [Self] : self [This encounter was initiated by telehealth (audio with video) and converted to telephone (audio only) due to technical difficulties.] : This encounter was initiated by telehealth (audio with video) and converted to telephone (audio only) due to technical difficulties. [Follow-up] : a follow-up of an existing diagnosis

## 2024-10-02 NOTE — DISCUSSION/SUMMARY
[FreeTextEntry1] : REASON FOR CONSULT Salma Pizarro is a 49-year-old female who was referred by Dr. Anaid Pandey for cancer genetic counseling and risk assessment due to a recent gastric cancer diagnosis and a family history of cancer. She was accompanied by her daughter-in-law who assisted in Wallisian translation. Genetic counseling student, Ashley Turpin, also participated in this session.  RELEVANT MEDICAL HISTORY Ms. Larry Pizarro was diagnosed with gastric cancer recently in 2024 at 48 years old. 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 Upcoming imaging will help to determine treatment plan.   OTHER MEDICAL AND SURGICAL HISTORY: -	Medical History: dyspepsia -	Surgical History: breast augmentation (, ), hysterectomy (; patient reports due to a polyp or fibroid), tummy tuck ()  PAST OB/GYN HISTORY: Obstetrical History:  Age at Menarche: 11 Menopausal with LMP at age 42  Age at First Live Birth: 21 Oral Contraceptive Use: No Hormone Replacement Therapy: No  CANCER SCREENING HISTORY:   Breast:  -	Mammography: last , reportedly normal  -	Sonography: No -	MRI: No -	Biopsies: No GYN: -	Pelvic Examination: last , reportedly normal  -	Sonography: last  in [facility], reportedly normal  -	CA-125: No Colon: -	Colonoscopy: last 2024, results pending. This was the patient's first colonoscopy. -	Upper Endoscopy: last 2024, gastric cancer was identified.  Skin:   -	FBSE: No -	Lesions biopsied/removed: No  SOCIAL HISTORY: -	Tobacco-product use: No  FAMILY HISTORY: Maternal ancestry and paternal ancestry were reported as [place]isian. Ashkenazi Gnosticism ancestry and consanguinity were denied. A detailed family history of cancer was ascertained. Relevant diagnoses are detailed below and in the scanned pedigree.   To Ms. Larry Pizarro's knowledge, no one in the family has had germline testing for cancer susceptibility.   	RISK ASSESSMENT: Ms. Larry Pizarro's personal history of gastric cancer and/or family history of gastric cancer and pancreatic cancer is suggestive of an inherited predisposition to gastric cancer and/or pancreatic cancer and related cancers. We recommended genetic testing for genes associated with gastric cancer and pancreatic cancer as well as the FH gene due to her personal and family history of fibroids. This test analyzes the following genes: APC, MARLI, BMPR1A, BRCA1, BRCA2, CDH1, CDKN2A, CTNNA1, EPCAM, FH, KIT, MEN1, MLH1, MSH2, MSH6, NF1, PALB2, PDGFRA, PMS2, SDHA, SDHB, SDHC, SDHD, SMAD4, STK11, TP53, TSC1, TSC2, VHL:   We discussed the risks, benefits and limitations, and implications of genetic testing. We also discussed the psychosocial implications of genetic testing. Possible test results were reviewed with Ms. Larry Pizarro, along with associated medical management options.   Ms. Larry Pizarro consented to the above-mentioned genetic testing panel. Blood was drawn in our laboratory and sent to [facility] today.  PLAN:  1.	Blood drawn today will be sent to [facility] for analysis.  2.	We will contact Ms. Larry Pizarro once the results are available and will schedule a follow-up appointment, as needed. Results generally return in 2-3 weeks from the day the sample is received in the lab.  For any additional questions please call Cancer Genetics at [phone].    Cherry Feldman MS, [facility] Genetic Counselor, Cancer Genetics   CC:  Dr. Anaid Pandey

## 2024-10-02 NOTE — PHYSICAL EXAM

## 2024-10-03 ENCOUNTER — TRANSCRIPTION ENCOUNTER (OUTPATIENT)
Age: 49
End: 2024-10-03

## 2024-10-03 NOTE — ASU PATIENT PROFILE, ADULT - FALL HARM RISK - UNIVERSAL INTERVENTIONS
Bed in lowest position, wheels locked, appropriate side rails in place/Call bell, personal items and telephone in reach/Instruct patient to call for assistance before getting out of bed or chair/Non-slip footwear when patient is out of bed/[place] to call system/Physically safe environment - no spills, clutter or unnecessary equipment/Purposeful Proactive Rounding/Room/bathroom lighting operational, light cord in reach

## 2024-10-04 ENCOUNTER — TRANSCRIPTION ENCOUNTER (OUTPATIENT)
Age: 49
End: 2024-10-04

## 2024-10-04 ENCOUNTER — APPOINTMENT (OUTPATIENT)
Dept: SURGICAL ONCOLOGY | Facility: HOSPITAL | Age: 49
End: 2024-10-04

## 2024-10-04 ENCOUNTER — OUTPATIENT (OUTPATIENT)
Dept: INPATIENT UNIT | Facility: HOSPITAL | Age: 49
LOS: 1 days | Discharge: HOME | End: 2024-10-04
Payer: MEDICAID

## 2024-10-04 ENCOUNTER — RESULT REVIEW (OUTPATIENT)
Age: 49
End: 2024-10-04

## 2024-10-04 VITALS
TEMPERATURE: 98 F | SYSTOLIC BLOOD PRESSURE: 104 MMHG | OXYGEN SATURATION: 99 % | DIASTOLIC BLOOD PRESSURE: 60 MMHG | HEART RATE: 71 BPM | HEIGHT: 62 IN | RESPIRATION RATE: 16 BRPM | WEIGHT: 149.91 LBS

## 2024-10-04 VITALS
SYSTOLIC BLOOD PRESSURE: 104 MMHG | RESPIRATION RATE: 15 BRPM | OXYGEN SATURATION: 97 % | DIASTOLIC BLOOD PRESSURE: 69 MMHG | HEART RATE: 60 BPM

## 2024-10-04 DIAGNOSIS — C16.4 MALIGNANT NEOPLASM OF PYLORUS: ICD-10-CM

## 2024-10-04 DIAGNOSIS — Z90.710 ACQUIRED ABSENCE OF BOTH CERVIX AND UTERUS: Chronic | ICD-10-CM

## 2024-10-04 DIAGNOSIS — Z98.82 BREAST IMPLANT STATUS: Chronic | ICD-10-CM

## 2024-10-04 LAB — HCG UR QL: NEGATIVE — SIGNIFICANT CHANGE UP

## 2024-10-04 PROCEDURE — 49321 LAPAROSCOPY BIOPSY: CPT

## 2024-10-04 PROCEDURE — 88305 TISSUE EXAM BY PATHOLOGIST: CPT | Mod: 26

## 2024-10-04 PROCEDURE — 88112 CYTOPATH CELL ENHANCE TECH: CPT | Mod: 26

## 2024-10-04 RX ORDER — DEXLANSOPRAZOLE 60 MG
1 CAPSULE, DELAYED RELEASE, BIPHASIC ORAL
Refills: 0 | DISCHARGE

## 2024-10-04 RX ORDER — SODIUM CHLORIDE IRRIG SOLUTION 0.9 %
1000 SOLUTION, IRRIGATION IRRIGATION
Refills: 0 | Status: DISCONTINUED | OUTPATIENT
Start: 2024-10-04 | End: 2024-10-04

## 2024-10-04 RX ORDER — SODIUM CHLORIDE IRRIG SOLUTION 0.9 %
1000 SOLUTION, IRRIGATION IRRIGATION
Refills: 0 | Status: ACTIVE | OUTPATIENT
Start: 2024-10-04 | End: 2025-09-02

## 2024-10-04 RX ORDER — HYDROMORPHONE HYDROCHLORIDE 1 MG/ML
0.25 INJECTION, SOLUTION INTRAMUSCULAR; INTRAVENOUS; SUBCUTANEOUS
Refills: 0 | Status: DISCONTINUED | OUTPATIENT
Start: 2024-10-04 | End: 2024-10-04

## 2024-10-04 RX ORDER — ONDANSETRON HCL/PF 4 MG/2 ML
4 VIAL (ML) INJECTION ONCE
Refills: 0 | Status: ACTIVE | OUTPATIENT
Start: 2024-10-04 | End: 2025-09-02

## 2024-10-04 RX ORDER — HYDROMORPHONE HYDROCHLORIDE 1 MG/ML
0.5 INJECTION, SOLUTION INTRAMUSCULAR; INTRAVENOUS; SUBCUTANEOUS
Refills: 0 | Status: DISCONTINUED | OUTPATIENT
Start: 2024-10-04 | End: 2024-10-04

## 2024-10-04 RX ADMIN — Medication 75 MILLILITER(S): at 09:30

## 2024-10-04 NOTE — ASU DISCHARGE PLAN (ADULT/PEDIATRIC) - ASU DC SPECIAL INSTRUCTIONSFT
WOUND CARE:   BATHING: Please do not submerge wound underwater. You may shower and/or sponge bathe.  ACTIVITY: No heavy lifting or straining. Otherwise, you may return to your usual level of physical activity. If you are taking narcotic pain medication (such as Percocet), do NOT drive a car, operate machinery or make important decisions.  DIET: Return to your usual diet.  NOTIFY YOUR SURGEON IF: You have any bleeding that does not stop, any pus draining from your wound, any fever (over 100.4 F) or chills, persistent nausea/vomiting, persistent diarrhea, or if your pain is not controlled on your discharge pain medications.  FOLLOW-UP:  1. Please call to make a follow-up appointment within one week of discharge   2. Please follow up with your primary care physician in one week regarding your hospitalization.    PAIN CONTROL: Please take Tylenol 1000mg and Ibuprofen 800mg every 6 hours, alternating each every 3 hours (For example, take Tylenol at 9am, then ibuprofen at 12pm, then Tylenol at 3pm).

## 2024-10-04 NOTE — ASU DISCHARGE PLAN (ADULT/PEDIATRIC) - CARE PROVIDER_API CALL
Mohini Valles, [facility]  Surgery  [address], Division [place] [address]  Phone: [phone]  Fax: [phone]  Follow Up Time: 2 weeks

## 2024-10-04 NOTE — ASU DISCHARGE PLAN (ADULT/PEDIATRIC) - NURSING INSTRUCTIONS
You received IV Tylenol for pain management at 8:30AM. Please DO NOT take any Tylenol (Acetaminophen) containing products, such as Vicodin, Percocet, Excedrin, and cold medications for the next 6 hours (until 2:30PM). DO NOT TAKE MORE THAN 4000 MG OF TYLENOL in a 24 hour period. Alternate Tylenol AND Motrin EVERY 3 hours making sure that each dose of Tylenol is 6 hours from previous tylenol dose and each dose of motrin is 6 hours from previous motrin dose.

## 2024-10-04 NOTE — BRIEF OPERATIVE NOTE - OPERATION/FINDINGS
3 5mm port diagnostic laparoscopy. RUQ Optiview entry with no abdominal organ injury identified. All 4 quadrants and pelvis inspected without evidence of metastatic disease. Lesser sac entry with visualization of tattooed biopsy. Peritoneal washings of abdominal cavity and lesser sac obtained. Removal of ports under visualization with hemostasis.

## 2024-10-05 RX ORDER — CYCLOBENZAPRINE HCL 10 MG
1 TABLET ORAL
Qty: 21 | Refills: 0
Start: 2024-10-05 | End: 2024-10-11

## 2024-10-05 RX ORDER — PANTOPRAZOLE SODIUM 40 MG/1
1 TABLET, DELAYED RELEASE ORAL
Qty: 30 | Refills: 0
Start: 2024-10-05 | End: 2024-11-03

## 2024-10-08 ENCOUNTER — NON-APPOINTMENT (OUTPATIENT)
Age: 49
End: 2024-10-08

## 2024-10-09 ENCOUNTER — NON-APPOINTMENT (OUTPATIENT)
Age: 49
End: 2024-10-09

## 2024-10-10 LAB — NON-GYNECOLOGICAL CYTOLOGY STUDY: SIGNIFICANT CHANGE UP

## 2024-10-15 ENCOUNTER — APPOINTMENT (OUTPATIENT)
Dept: NUCLEAR MEDICINE | Facility: CLINIC | Age: 49
End: 2024-10-15
Payer: MEDICAID

## 2024-10-15 ENCOUNTER — APPOINTMENT (OUTPATIENT)
Dept: NUCLEAR MEDICINE | Facility: CLINIC | Age: 49
End: 2024-10-15

## 2024-10-15 PROCEDURE — 78815 PET IMAGE W/CT SKULL-THIGH: CPT | Mod: PI

## 2024-10-15 PROCEDURE — A9552: CPT

## 2024-10-16 ENCOUNTER — NON-APPOINTMENT (OUTPATIENT)
Age: 49
End: 2024-10-16

## 2024-10-16 ENCOUNTER — APPOINTMENT (OUTPATIENT)
Dept: SURGICAL ONCOLOGY | Facility: CLINIC | Age: 49
End: 2024-10-16
Payer: MEDICAID

## 2024-10-16 VITALS
HEIGHT: 62 IN | HEART RATE: 74 BPM | DIASTOLIC BLOOD PRESSURE: 69 MMHG | OXYGEN SATURATION: 97 % | BODY MASS INDEX: 26.5 KG/M2 | WEIGHT: 144 LBS | SYSTOLIC BLOOD PRESSURE: 110 MMHG

## 2024-10-16 DIAGNOSIS — C16.4: ICD-10-CM

## 2024-10-16 PROCEDURE — 99024 POSTOP FOLLOW-UP VISIT: CPT

## 2024-10-18 ENCOUNTER — APPOINTMENT (OUTPATIENT)
Dept: SURGICAL ONCOLOGY | Facility: HOSPITAL | Age: 49
End: 2024-10-18

## 2024-10-23 ENCOUNTER — LABORATORY RESULT (OUTPATIENT)
Age: 49
End: 2024-10-23

## 2024-10-23 ENCOUNTER — NON-APPOINTMENT (OUTPATIENT)
Age: 49
End: 2024-10-23

## 2024-10-30 ENCOUNTER — APPOINTMENT (OUTPATIENT)
Dept: SURGICAL ONCOLOGY | Facility: CLINIC | Age: 49
End: 2024-10-30

## 2024-10-30 ENCOUNTER — APPOINTMENT (OUTPATIENT)
Dept: SURGICAL ONCOLOGY | Facility: CLINIC | Age: 49
End: 2024-10-30
Payer: MEDICAID

## 2024-10-30 PROCEDURE — 99214 OFFICE O/P EST MOD 30 MIN: CPT | Mod: 95

## 2025-01-07 ENCOUNTER — NON-APPOINTMENT (OUTPATIENT)
Age: 50
End: 2025-01-07

## 2025-01-07 ENCOUNTER — APPOINTMENT (OUTPATIENT)
Dept: SURGICAL ONCOLOGY | Facility: CLINIC | Age: 50
End: 2025-01-07

## 2025-01-08 ENCOUNTER — NON-APPOINTMENT (OUTPATIENT)
Age: 50
End: 2025-01-08

## 2025-01-15 ENCOUNTER — APPOINTMENT (OUTPATIENT)
Dept: SURGICAL ONCOLOGY | Facility: CLINIC | Age: 50
End: 2025-01-15

## 2025-05-18 ENCOUNTER — EMERGENCY (EMERGENCY)
Facility: HOSPITAL | Age: 50
LOS: 1 days | End: 2025-05-18
Admitting: EMERGENCY MEDICINE
Payer: MEDICAID

## 2025-05-18 VITALS
DIASTOLIC BLOOD PRESSURE: 72 MMHG | TEMPERATURE: 99 F | RESPIRATION RATE: 18 BRPM | HEART RATE: 81 BPM | OXYGEN SATURATION: 100 % | HEIGHT: 62 IN | WEIGHT: 143.96 LBS | SYSTOLIC BLOOD PRESSURE: 116 MMHG

## 2025-05-18 DIAGNOSIS — Z98.82 BREAST IMPLANT STATUS: Chronic | ICD-10-CM

## 2025-05-18 DIAGNOSIS — Z90.710 ACQUIRED ABSENCE OF BOTH CERVIX AND UTERUS: Chronic | ICD-10-CM

## 2025-05-18 PROCEDURE — 99283 EMERGENCY DEPT VISIT LOW MDM: CPT

## 2025-05-18 RX ORDER — ERYTHROMYCIN 5 MG/G
1 OINTMENT OPHTHALMIC
Qty: 1 | Refills: 0
Start: 2025-05-18 | End: 2025-05-24

## 2025-05-18 RX ORDER — CEPHALEXIN 250 MG/1
1 CAPSULE ORAL
Qty: 28 | Refills: 0
Start: 2025-05-18 | End: 2025-05-24

## 2025-05-18 NOTE — ED ADULT NURSE NOTE - NSFALLUNIVINTERV_ED_ALL_ED
Bed/Stretcher in lowest position, wheels locked, appropriate side rails in place/Call bell, personal items and telephone in reach/Instruct patient to call for assistance before getting out of bed/chair/stretcher/Non-slip footwear applied when patient is off stretcher/[place] to call system/Physically safe environment - no spills, clutter or unnecessary equipment/Purposeful proactive rounding/Room/bathroom lighting operational, light cord in reach

## 2025-05-18 NOTE — ED PROVIDER NOTE - OBJECTIVE STATEMENT
48 y/o F with PMH of adenocarcinoma last chemo 1 month earlier currently in remission, presents c/o right eye lower eyelid pain and swelling x 2 days. Patient notes that when she woke up yesterday morning she was having some pain in her lower right eyelid. Over the course of the day the pain was getting worse with some associated swelling into the middle of the lower eyelid. She tried using warm compresses and tea bags over the area and took some tylenol but without much improvement in her symptoms. Today the swelling was noted to have spread through the cheek below the right eye and spread towards her right ear with associated yellow discharge and increased tearing, prompting her current visit. Patient is not an contact lense wearer. Denies any other complaints or concerns. Denies visual changes, chest pain, SOB, cough, fevers, chills, abdominal pain, N/V/D/C, urinary complaints, HA, dizziness, numbness, tingling, weakness, trauma, injuries, falls, sick contacts, recent travel.

## 2025-05-18 NOTE — ED ADULT NURSE NOTE - OBJECTIVE STATEMENT
Pt received to intake 5. Pt is a 49 year old female w/ no significant hx. Pt  presents to ED c/o stye on R eye. Pt states R eye started to feel discomfort starting on Friday and woke up this morning w/ stye on R eye and puffiness of R face. Pt endorses pain and discharge from R eye. Pt A&Ox4. No respiratory distress noted. Respirations even and unlabored. Lesion noted on R eye. R face noted to be puffy and reddened in appearance. Pt safety precautions maintained. Pt awaiting meds from [place]. Pt safety precautions maintained. Pt care ongoing

## 2025-05-18 NOTE — ED PROVIDER NOTE - NS ED MD DISPO DISCHARGE CCDA
illnesses that may run in your family, and various assessments and screenings as appropriate.  After reviewing your medical record and screening and assessments performed today your provider may have ordered immunizations, labs, imaging, and/or referrals for you.  A list of these orders (if applicable) as well as your Preventive Care list are included within your After Visit Summary for your review.      
Patient/Caregiver provided printed discharge information.

## 2025-05-18 NOTE — ED PROVIDER NOTE - PATIENT PORTAL LINK FT
You can access the FollowMyHealth Patient Portal offered by [facility] by registering at the following website: [email or web address]. By joining [employer]’s FollowMyHealth portal, you will also be able to view your health information using other applications (apps) compatible with our system.

## 2025-05-18 NOTE — ED ADULT TRIAGE NOTE - CHIEF COMPLAINT QUOTE
c/o stye in the right eyex2 days. Denies vision changes but endorses pain around eye. Eye Red and White Discharge. Pt denies CP, SOB, N/V/D. Fever and Chills. Denies c/o stye in the right eyex2 days. Denies vision changes but endorses pain around eye. Eye Red and White Discharge. Pt denies CP, SOB, N/V/D. Fever and Chills. Denies Hx

## 2025-05-18 NOTE — ED PROVIDER NOTE - CLINICAL SUMMARY MEDICAL DECISION MAKING FREE TEXT BOX
50 y/o F with PMH of adenocarcinoma last chemo 1 month earlier currently in remission, presents c/o right eye lower eyelid pain and swelling x 2 days. No concerns for preseptal or periorbital cellulitis at this time. Will recommend treatment with erythromycin ointment and to continue with warm compresses with massages at least 4 times a day for 15 minutes at a time. Will also encourage taking tylenol and/or motrin as needed for pain. Will send a prescription of keflex if after 2 days of current treatment there is no improvement in symptoms. Will also provide a referral so that if symptoms persists past 1-2 weeks patient can follow up with ophthalmology. Patient comfortable and stable for discharge with pcp and ophthalmology follow up. Instructed to return to the ED immediately for any worsening symptoms or new concerns.    PLAN AND FOLLOW-UP: Patient counseled on all findings, diagnosis and treatment plan. Patient's questions and concerns addressed. Patient stable, discharged with instructions to follow up with PMD, and to return to ED at any time for worsening symptoms or any other concerns. Patient demonstrates understanding of the findings and the importance of appropriate follow up care.

## 2025-05-18 NOTE — ED ADULT NURSE NOTE - CHIEF COMPLAINT QUOTE
c/o stye in the right eyex2 days. Denies vision changes but endorses pain around eye. Eye Red and White Discharge. Pt denies CP, SOB, N/V/D. Fever and Chills. Denies Hx

## 2025-05-18 NOTE — ED PROVIDER NOTE - PHYSICAL EXAMINATION
CONSTITUTIONAL: Comfortable; in no acute distress. Non-toxic appearing.   NEURO: Alert & oriented. Sensory and motor functions are grossly intact.  PSYCH: Mood appropriate. Thought processes intact.   HEENT: NCAT; (+) large hordeolum in the center of the right lower eyelid, tender and firm to palpation, yellow discharge, and mild surrounding swelling to the cheek below the right eye, but without associated increased warmth or erythema. EMOI. PERRL. Visual acuity intact.  MUSCULOSKELETAL/EXTREMITIES: FROM in all four extremities; no extremity edema.  SKIN: Warm; dry; no apparent lesions or exudate

## (undated) DEVICE — TUBING HYDRO-SURG PLUS IRRIGATOR W SMOKEVAC & PROBE

## (undated) DEVICE — CATH IV SAFE BC 22G X 1" (BLUE)

## (undated) DEVICE — SPONGE PEANUT AUTO COUNT

## (undated) DEVICE — BASIN EMESIS 10IN GRADUATED MAUVE

## (undated) DEVICE — TUBING OLYMPUS INSUFFLATION

## (undated) DEVICE — POSITIONER STRAP ARMBOARD VELCRO TS-30

## (undated) DEVICE — PROTECTOR HEEL / ELBOW FLUFFY

## (undated) DEVICE — SOL IRR POUR NS 0.9% 500ML

## (undated) DEVICE — WARMING BLANKET FULL ADULT

## (undated) DEVICE — SUT VICRYL 0 27" UR-6

## (undated) DEVICE — CONTAINER FORMALIN 10% 20ML

## (undated) DEVICE — SUT MONOCRYL 4-0 27" PS-2 UNDYED

## (undated) DEVICE — PACK MAJOR ABDOMINAL W ENDO DRAPE

## (undated) DEVICE — LIGASURE EXACT DISSECTOR

## (undated) DEVICE — ELCTR ECG CONDUCTIVE ADHESIVE

## (undated) DEVICE — GOWN LG

## (undated) DEVICE — UNDERPAD LINEN SAVER 17 X 24"

## (undated) DEVICE — STAPLER COVIDIEN ENDO GIA STANDARD HANDLE

## (undated) DEVICE — SUT SILK 3-0 18" SH (POP-OFF)

## (undated) DEVICE — LUBRICATING JELLY HR ONE SHOT 3G

## (undated) DEVICE — BIOPSY FORCEP RADIAL JAW 4 STANDARD WITH NEEDLE

## (undated) DEVICE — CLAMP BX HOT RAD JAW 3

## (undated) DEVICE — LIGASURE MARYLAND 37CM

## (undated) DEVICE — DRSG 2X2

## (undated) DEVICE — SUT VICRYL 2-0 27" SH UNDYED

## (undated) DEVICE — PACK IV START WITH CHG

## (undated) DEVICE — ELCTR BOVIE TIP BLADE INSULATED 2.75" EDGE

## (undated) DEVICE — BASIN SET SINGLE

## (undated) DEVICE — TUBING IV SET GRAVITY 3Y 100" MACRO

## (undated) DEVICE — VENODYNE/SCD SLEEVE CALF MEDIUM

## (undated) DEVICE — ELCTR BOVIE TIP BLADE INSULATED 6.5" EDGE

## (undated) DEVICE — DRSG BANDAID 0.75X3"

## (undated) DEVICE — SALIVA EJECTOR (BLUE)

## (undated) DEVICE — BIOPSY FORCEP COLD DISP

## (undated) DEVICE — BITE BLOCK ADULT 20 X 27MM (GREEN)

## (undated) DEVICE — SUT CHROMIC 3-0 27" SH

## (undated) DEVICE — ELCTR BOVIE PENCIL SMOKE EVACUATION

## (undated) DEVICE — SOL IRR POUR H2O 500ML

## (undated) DEVICE — Device

## (undated) DEVICE — TROCAR ETHICON ENDOPATH XCEL BLADELESS 5MM X 100MM STABILITY

## (undated) DEVICE — ELCTR GROUNDING PAD ADULT COVIDIEN

## (undated) DEVICE — DRSG CURITY GAUZE SPONGE 4 X 4" 12-PLY NON-STERILE

## (undated) DEVICE — TIP METZENBAUM SCISSOR MONOPOLAR ENDOCUT (ORANGE)

## (undated) DEVICE — LABELS BLANK W PEN

## (undated) DEVICE — MARKER ENDO SPOT EX

## (undated) DEVICE — TUBING MEDI-VAC W MAXIGRIP CONNECTORS 1/4"X6'

## (undated) DEVICE — CONTAINER FORMALIN 80ML YELLOW

## (undated) DEVICE — DENTURE CUP PINK